# Patient Record
Sex: MALE | NOT HISPANIC OR LATINO | Employment: UNEMPLOYED | URBAN - NONMETROPOLITAN AREA
[De-identification: names, ages, dates, MRNs, and addresses within clinical notes are randomized per-mention and may not be internally consistent; named-entity substitution may affect disease eponyms.]

---
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
## 2024-05-07 ENCOUNTER — TRANSCRIBE ORDERS (OUTPATIENT)
Dept: ADMINISTRATIVE | Facility: HOSPITAL | Age: 24
End: 2024-05-07
Payer: MEDICAID

## 2024-05-07 DIAGNOSIS — R00.2 PALPITATIONS: Primary | ICD-10-CM

## 2024-06-11 ENCOUNTER — OFFICE VISIT (OUTPATIENT)
Dept: PSYCHIATRY | Facility: CLINIC | Age: 24
End: 2024-06-11
Payer: MEDICAID

## 2024-06-11 DIAGNOSIS — Z63.9 CONFLICT BETWEEN PATIENT AND FAMILY: ICD-10-CM

## 2024-06-11 DIAGNOSIS — F33.1 MAJOR DEPRESSIVE DISORDER, RECURRENT EPISODE, MODERATE: Primary | ICD-10-CM

## 2024-06-11 DIAGNOSIS — F41.9 MILD ANXIETY: ICD-10-CM

## 2024-06-11 PROCEDURE — 90791 PSYCH DIAGNOSTIC EVALUATION: CPT | Performed by: COUNSELOR

## 2024-06-11 PROCEDURE — 1160F RVW MEDS BY RX/DR IN RCRD: CPT | Performed by: COUNSELOR

## 2024-06-11 PROCEDURE — 1159F MED LIST DOCD IN RCRD: CPT | Performed by: COUNSELOR

## 2024-06-11 SDOH — SOCIAL STABILITY - SOCIAL INSECURITY: PROBLEM RELATED TO PRIMARY SUPPORT GROUP, UNSPECIFIED: Z63.9

## 2024-06-21 NOTE — PROGRESS NOTES
"Mani Morales is a 24 y.o. male who is here today, 6/11/2024 for initial behavioral health evaluation starting at 2:30 PM and ending at 3:30 PM.    Chief Complaint:    Patient rated depression at 3.5-4 and anxiety at 2 with 10 being the most severe.    History of Present Illness:  \"My sister said I have an anger issues, I raise my voice thinking I am justified.  I am quick to anger.  In my family everyone is very sharp.\"    \"I pay my own rent and I do not want to be treated like a kid anymore.\"    \"I was the only one born in [place].  I grew up in [place] until I was 12 or 13 years old and we moved to [place] to be near my aunt and uncle.  I went to high school in [place] and lived with my cousins.  My parents live in [place].  When I first came here it was a culture shock!  I was the only . I was given over 20 Bibles.  I went to churches, I didn't know my own Buddhism.  I was mediocre.  In [place], my friends were all different nationalities. Here everyone was white and there were [employer] schools.  They graded differently.  I was able to excel easier.  My parents owned a gas station.\"                         \"I have ADHD.  I graduated a year ago from the [employer] with a biology/premed degree and a minor in psychology.  I took the MCAT and applied twice for medical school. I have been accepted to do a masters in biology.  I live in a house with my friends.  \"I really love my research.  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  It is not a stressful work environment.\"    \"I have more support from my friends than family.  I have 3 roommates, I'm the only Mosotho.  I come home every couple weeks.\"    Past Psych History:  Patient denies previous outpatient therapy or psychiatric hospitalization.     Substance Abuse:  \"I needed to drink to be in a fraternity, I used to be embarrassed, but I left that part behind, I matured fast.  I have " "embarrassed myself the last 18 months.  I have never used marijuana, or any other substances.\"    Social History:   Patient's father, age 55 owns 3 gas stations and is hard-working.  \"My parents  just before they came here in 1969.  My father was yelling and sometimes scared me out of my wits but later he apologizes.  His anger is never unjustified.  He is not the most well off, but he has sacrificed and is a good provider.\"  Patient rates her emotional connection at 10/10 with 10 being the closest emotional connection.    Patient's mother, age 54 \"works with my dad.  She used to have a bad hip but had a hip replacement 2 years ago and it is better now.  She is mostly at home, is a very good cook cleans our house, manages the finances and helped plan my sister's wedding.  She is more of a workaholic than my dad.  My mother and I have a love-hate relationship.  She criticizes if I feel off with the belief that if we work hard enough we won't have depression.  She likes to say, 'As you live now, it is the same in the afterlife.'  Patient rated his emotional connection with his mother at 5/10.  'She can be so forthright, and insulting.  She compares me with everybody and minimizes my research.  \"You have not gotten anywhere with school.'  She says, 'I am your mother, I have a right to talk to like that.'  I love her to death, she is like her mother and my aunt.  My dad says, 'Is probably wise not to continue.'\"    Patient's sister, Akiko, age 29, \"is a chiropractor.  She went to high school in [place] and  an [place]  2 weeks ago.  They live in [place].  She is the first person I tell/confide in.  She protects me to my parents.\"    \"In January I just got out on a 6-month relationship.  She is Sabianism.  We had strong feelings and I fell head over heels with her.  She is half Mont[place]gr[place] and half [place] and her last name was Ali.  We met on an online dating nanette but I visited her a couple of times.  " "Her parents brought it up and all her family.  I'm a Morales from [facility], we are all Spiritism.  I\"m not a strong believer.  My parents and sister are more than I am.  I go to temple and pray.  I only dated Patels before.  I told her I was willing to explore the [employer] and my parents were decently open and at first they were okay with it.  It was a mutual breakup.  Reputation goes a long way.  Each set of parents reputations are important, we have to consider other people's loss.  My mother was willing to accept if I became Cheondoism. My girlfriend could affect my father's business.  We haven't dated since.  It hurt me a lot.  We still talk after to comfort, it was a terrible experience.  I still think about her all the time.  She texted me on my sister's wedding day. My parents are very homophobic.  Sex before marriage is not a problem.\"      Visit Diagnoses:    ICD-10-CM ICD-9-CM   1. Major depressive disorder, recurrent episode, moderate  F33.1 296.32   2. Mild anxiety  F41.9 300.00   3. Conflict between patient and family  Z63.9 V61.9         Family Psychiatric History:  family history is not on file.    Medical/Surgical History:  History reviewed. No pertinent past medical history.  History reviewed. No pertinent surgical history.    Not on File        Current Medications:   No current outpatient medications on file.     No current facility-administered medications for this visit.         Objective   There were no vitals taken for this visit.    Mental Status Exam:   Hygiene:   good  Cooperation:  Cooperative  Eye Contact:  Good  Psychomotor Behavior:  Appropriate  Affect:  Appropriate  Hopelessness: 4  Speech:  Normal  Thought Process:  Goal directed and Linear  Thought Content:  Normal  Suicidal:  None  Homicidal:  None  Hallucinations:  None  Delusion:  None  Memory:  Intact  Orientation:  Person, Place, Time, and Situation  Reliability:  good  Insight:  Good  Judgement:  Good  Impulse Control:  " Good  Physical/Medical Issues:   None reported      DIAGNOSTIC IMPRESSION:   Encounter Diagnoses   Name Primary?    Major depressive disorder, recurrent episode, moderate Yes    Mild anxiety     Conflict between patient and family        PROBLEM LIST:   Patient is in his 20s, his mother is still criticizing him even though he is very responsible.    STRENGTHS:   Patient appears motivated for treatment is currently engaged and compliant.    WEAKNESSES:  Ineffective coping skills, disease management      SHORT-TERM GOALS: Patient will be compliant with clinic appointments.  Patient will be engaged in therapy, medication compliant with minimal side effects. Patient  will report decreased frequency and severity of symptoms.     LONG-TERM GOALS: Patient will have minimal symptoms of  with continued medication management. Patient will be compliant with treatment and appointments.       PLAN:   Patient will continue with individual outpatient treatment and pharmacotherapy as scheduled.     Return in about 2 weeks (around 6/25/2024).     The patient was instructed to call clinic as needed or go to ER if in crisis.          This document electronically signed by Rachna Jones, LPCC, NCC, CSAT    Rachna Jones  Licensed Professional Clinical Counselor  Certified Sexual Addiction Therapist